# Patient Record
Sex: MALE | Race: WHITE | NOT HISPANIC OR LATINO | Employment: FULL TIME | URBAN - METROPOLITAN AREA
[De-identification: names, ages, dates, MRNs, and addresses within clinical notes are randomized per-mention and may not be internally consistent; named-entity substitution may affect disease eponyms.]

---

## 2019-04-29 ENCOUNTER — OCCUPATIONAL HEALTH (OUTPATIENT)
Dept: URGENT CARE | Facility: CLINIC | Age: 44
End: 2019-04-29

## 2019-04-29 ENCOUNTER — OFFICE VISIT (OUTPATIENT)
Dept: URGENT CARE | Facility: CLINIC | Age: 44
End: 2019-04-29
Payer: COMMERCIAL

## 2019-04-29 VITALS
BODY MASS INDEX: 28.17 KG/M2 | HEIGHT: 64 IN | WEIGHT: 165 LBS | TEMPERATURE: 97 F | DIASTOLIC BLOOD PRESSURE: 76 MMHG | RESPIRATION RATE: 16 BRPM | HEART RATE: 65 BPM | OXYGEN SATURATION: 98 % | SYSTOLIC BLOOD PRESSURE: 131 MMHG

## 2019-04-29 DIAGNOSIS — R10.9 ABDOMINAL WALL PAIN: ICD-10-CM

## 2019-04-29 DIAGNOSIS — K46.9 HERNIA OF ABDOMINAL CAVITY: Primary | ICD-10-CM

## 2019-04-29 DIAGNOSIS — Z02.83 ENCOUNTER FOR DRUG SCREENING: Primary | ICD-10-CM

## 2019-04-29 DIAGNOSIS — S39.011A STRAIN OF ABDOMINAL WALL, INITIAL ENCOUNTER: Primary | ICD-10-CM

## 2019-04-29 LAB — POC BREATH ALCOHOL: NEGATIVE

## 2019-04-29 PROCEDURE — 80305 DRUG TEST PRSMV DIR OPT OBS: CPT | Mod: S$GLB,,, | Performed by: PREVENTIVE MEDICINE

## 2019-04-29 PROCEDURE — 99204 PR OFFICE/OUTPT VISIT, NEW, LEVL IV, 45-59 MIN: ICD-10-PCS | Mod: S$GLB,,, | Performed by: PREVENTIVE MEDICINE

## 2019-04-29 PROCEDURE — 99204 OFFICE O/P NEW MOD 45 MIN: CPT | Mod: S$GLB,,, | Performed by: PREVENTIVE MEDICINE

## 2019-04-29 PROCEDURE — 80305 OOH COLLECTION ONLY DRUG SCREEN: ICD-10-PCS | Mod: S$GLB,,, | Performed by: PREVENTIVE MEDICINE

## 2019-04-29 PROCEDURE — 82075 ASSAY OF BREATH ETHANOL: CPT | Mod: S$GLB,,, | Performed by: PREVENTIVE MEDICINE

## 2019-04-29 PROCEDURE — 99199 UNLISTED SPECIAL SVC PX/RPRT: CPT | Mod: S$GLB,,, | Performed by: PREVENTIVE MEDICINE

## 2019-04-29 PROCEDURE — 99199 OCC MED MRO FEE: ICD-10-PCS | Mod: S$GLB,,, | Performed by: PREVENTIVE MEDICINE

## 2019-04-29 PROCEDURE — 82075 POCT ALCOHOL BREATH TEST: ICD-10-PCS | Mod: S$GLB,,, | Performed by: PREVENTIVE MEDICINE

## 2019-04-29 RX ORDER — PREDNISONE 10 MG/1
TABLET ORAL
Refills: 0 | COMMUNITY
Start: 2019-03-11

## 2019-04-29 RX ORDER — TESTOSTERONE CYPIONATE 200 MG/ML
INJECTION, SOLUTION INTRAMUSCULAR
Refills: 3 | COMMUNITY
Start: 2019-04-23

## 2019-04-29 RX ORDER — METHOCARBAMOL 750 MG/1
TABLET, FILM COATED ORAL
Refills: 0 | COMMUNITY
Start: 2019-03-11

## 2019-04-29 NOTE — LETTER
[facility]  [address]  [place] [address]  Phone: [phone]  Fax: [phone]  [facility]: [id number]    Pt Name: Bart Olvera  Injury Date: 04/28/2019   Employee ID:  Date of First Treatment: 04/29/2019   Company: [employer]      Appointment Time:  Arrived: 8:30 AM   Provider: Fabio Floyd MD Time Out: 10:30 AM     Office Treatment:   1. Strain of abdominal wall, initial encounter    2. Abdominal wall pain          Patient Instructions: Daily home exercises/warm soaks(Maintain light exercise for abdominal wall muscle. Monitor for any changes while working and exercising. Take OTC medication as needed.)    Restrictions: Regular Duty     Return Appointment: 05/20/2019 at 9:30 AM

## 2019-04-29 NOTE — PROGRESS NOTES
Subjective:       Patient ID: Bart Olvera is a 43 y.o. male.    Chief Complaint: Abdominal Pain    NEW WC INJ of Abdomen ( DOI 04-28-19 ) While at work he slid a sofa back and felt a burning sensation in umbilical area and then a bulge appear. Pain score today is 2/10 with concern about the bulge. Questioning about possible surgery? GAYE    Review of Systems   Gastrointestinal: Positive for abdominal pain. Negative for nausea.   All other systems reviewed and are negative.      Objective:      Physical Exam   Constitutional: He is oriented to person, place, and time. He appears well-developed and well-nourished.   HENT:   Head: Normocephalic and atraumatic.   Eyes: Pupils are equal, round, and reactive to light. EOM are normal.   Neck: Normal range of motion. Neck supple.   Cardiovascular: Normal rate.   Pulmonary/Chest: Effort normal.   Abdominal: Soft. Bowel sounds are normal. There is tenderness.       Mild pain with palpation of the wall of the abdomen midline. Patient has weakness with palpation and inspection of the abdominus rectus muscle wall just above the umbilicus. No apparent hernia noted at this time. Bowel sounds active with no significant pain on rebound on deep palpation of the abdomen.   Musculoskeletal: Normal range of motion.   Neurological: He is alert and oriented to person, place, and time.   Skin: Skin is warm and dry.   Psychiatric: He has a normal mood and affect.   Nursing note and vitals reviewed.      Assessment:       1. Strain of abdominal wall, initial encounter    2. Abdominal wall pain        Plan:            Patient Instructions: Daily home exercises/warm soaks(Maintain light exercise for abdominal wall muscle. Monitor for any changes while working and exercising. Take OTC medication as needed.)   Restrictions: Regular Duty  Follow up in about 3 weeks (around 5/20/2019).

## 2022-05-09 ENCOUNTER — OFFICE VISIT (OUTPATIENT)
Dept: URGENT CARE | Facility: CLINIC | Age: 47
End: 2022-05-09
Payer: COMMERCIAL

## 2022-05-09 VITALS
BODY MASS INDEX: 26.66 KG/M2 | DIASTOLIC BLOOD PRESSURE: 74 MMHG | WEIGHT: 160 LBS | HEART RATE: 69 BPM | TEMPERATURE: 99 F | HEIGHT: 65 IN | SYSTOLIC BLOOD PRESSURE: 116 MMHG | OXYGEN SATURATION: 97 %

## 2022-05-09 DIAGNOSIS — R07.89 CHEST TIGHTNESS: ICD-10-CM

## 2022-05-09 DIAGNOSIS — R94.39 ABNORMAL CARDIOVASCULAR STRESS TEST: Primary | ICD-10-CM

## 2022-05-09 DIAGNOSIS — R05.9 COUGH: ICD-10-CM

## 2022-05-09 PROCEDURE — 99203 PR OFFICE/OUTPT VISIT, NEW, LEVL III, 30-44 MIN: ICD-10-PCS | Mod: S$GLB,,, | Performed by: EMERGENCY MEDICINE

## 2022-05-09 PROCEDURE — 99203 OFFICE O/P NEW LOW 30 MIN: CPT | Mod: S$GLB,,, | Performed by: EMERGENCY MEDICINE

## 2022-05-09 NOTE — PROGRESS NOTES
Subjective:       Patient ID: Bart Olvera is a 46 y.o. male.    Chief Complaint: Chest Pain and Cough    ROSALINA SR (DOI 3- 2022) worker for [employer]. Patient is here today to follow up on symptoms he had checked out at  on May 6th. He had been experiencing check tightness and having coughing spell during a regular work day. He went to be checked out after he could not stop coughing for nearly three hours. He was to the point of almost passing out. He was given a stress test and X-rays were taking. He was given a follow up appt because something came back on the test results. He assigned 0/10 pain today and states the last coughing spell was on May 6th and he found it very hard to get it undr econtrol. He does not have an inhaler.  [facility]      PATIENT IS A 46-YEAR-OLD MALE WITH NO SIGNIFICANT PAST MEDICAL HISTORY FIBER MIN.  LAST WEEK HE WAS HAVING SOME CHEST TIGHTNESS AND COUGHING SPELLS WHICH PROMPTED HIM TO GET CHECKED OUT OF THE EMERGENCY DEPARTMENT.  HE HAD EKG SHOWING NO ACUTE ISCHEMIC CHANGES AS WELL AS CHEST X-RAY SHOWING NO ACUTE ABNORMALITY.  HIS VITAL SIGNS WERE STABLE.  HIS TROPONINS WERE NEGATIVE.  DUE TO HIS STORY OF CHEST TIGHTNESS AND NEGATIVE WORKUP WAS ADMITTED FOR OBSERVATION AND DID SEE CARDIOLOGIST WHO PERFORMED A CHEMICAL STRESS TEST.  THE STRESS TEST WAS ABNORMAL WITH SOME WALL MOTION ABNORMALITY AND DESCRIBED AS MILDLY POSITIVE.  HE WAS PLACED ON LONG-ACTING NITROGLYCERIN AND ARRANGE FOLLOW-UP WITH CARDIOLOGY IN 2 WEEKS TO DECIDE WHETHER OR NOT ANGIOGRAM INDICATED.  HE WILL KEEP THAT APPOINTMENT AND FOLLOW THE RECOMMENDATIONS OF CARDIOLOGY AND WILL NEED CARDIOLOGY CLEARANCE TO RETURN TO WORK.  I ALSO INSTRUCTED HIM TO FOLLOW-UP WITH PRIMARY CARE PHYSICIAN IF THIS ERRATIC COUGH CONTINUES.  HE DENIES ANY SYMPTOMS AT THIS TIME INCLUDING NO CHEST PAIN NO CHEST TIGHTNESS NO COUGH.  HOWEVER CERTAINLY GIVEN JOB DESCRIPTION WOULD BENEFIT FROM FULL CARDIOLOGY CLEARANCE BEFORE  RETURNING TO WORK.      ROS    Constitution: Positive for activity change.   HENT: Negative.    Neck: neck negative.   Cardiovascular: Positive for chest pain.   Eyes: Negative for vision loss, double vision and blurred vision.   Respiratory: Positive for cough and shortness of breath. Negative for sputum production, wheezing and asthma.    Genitourinary: Negative.    Musculoskeletal: Negative for pain, trauma, joint pain and joint swelling.   Skin: Negative.    Allergic/Immunologic: Negative.  Negative for asthma.   Neurological: Negative for passing out and loss of balance.   Hematologic/Lymphatic: Negative.         Objective:      Physical Exam  Vitals and nursing note reviewed.   Constitutional:       General: He is not in acute distress.     Appearance: Normal appearance. He is well-developed. He is not ill-appearing, toxic-appearing or diaphoretic.   HENT:      Head: Normocephalic and atraumatic.      Jaw: No trismus.      Right Ear: Hearing, tympanic membrane, ear canal and external ear normal.      Left Ear: Hearing, tympanic membrane, ear canal and external ear normal.      Nose: Nose normal. No nasal deformity, mucosal edema or rhinorrhea.      Right Sinus: No maxillary sinus tenderness or frontal sinus tenderness.      Left Sinus: No maxillary sinus tenderness or frontal sinus tenderness.      Mouth/Throat:      Dentition: Normal dentition.      Pharynx: Uvula midline. No posterior oropharyngeal erythema or uvula swelling.   Eyes:      General: Lids are normal. No scleral icterus.     Conjunctiva/sclera: Conjunctivae normal.      Comments: Sclera clear bilat   Neck:      Trachea: Trachea and phonation normal.   Cardiovascular:      Rate and Rhythm: Normal rate and regular rhythm.      Pulses: Normal pulses.      Heart sounds: Normal heart sounds.   Pulmonary:      Effort: Pulmonary effort is normal. No respiratory distress.      Breath sounds: Normal breath sounds.   Abdominal:      General: Bowel sounds  are normal. There is no distension.      Palpations: Abdomen is soft.      Tenderness: There is no abdominal tenderness.   Musculoskeletal:         General: No deformity. Normal range of motion.      Cervical back: Full passive range of motion without pain and neck supple.   Skin:     General: Skin is warm and dry.      Coloration: Skin is not pale.   Neurological:      Mental Status: He is alert and oriented to person, place, and time.      Motor: No abnormal muscle tone.      Coordination: Coordination normal.   Psychiatric:         Speech: Speech normal.         Behavior: Behavior normal. Behavior is cooperative.         Thought Content: Thought content normal.         Judgment: Judgment normal.         Assessment:       1. Abnormal cardiovascular stress test    2. Chest tightness    3. Cough        Plan:       PATIENT IS A 46-YEAR-OLD MALE WITH NO SIGNIFICANT PAST MEDICAL HISTORY FIBER MIN.  LAST WEEK HE WAS HAVING SOME CHEST TIGHTNESS AND COUGHING SPELLS WHICH PROMPTED HIM TO GET CHECKED OUT OF THE EMERGENCY DEPARTMENT.  HE HAD EKG SHOWING NO ACUTE ISCHEMIC CHANGES AS WELL AS CHEST X-RAY SHOWING NO ACUTE ABNORMALITY.  HIS VITAL SIGNS WERE STABLE.  HIS TROPONINS WERE NEGATIVE.  DUE TO HIS STORY OF CHEST TIGHTNESS AND NEGATIVE WORKUP WAS ADMITTED FOR OBSERVATION AND DID SEE CARDIOLOGIST WHO PERFORMED A CHEMICAL STRESS TEST.  THE STRESS TEST WAS ABNORMAL WITH SOME WALL MOTION ABNORMALITY AND DESCRIBED AS MILDLY POSITIVE.  HE WAS PLACED ON LONG-ACTING NITROGLYCERIN AND ARRANGE FOLLOW-UP WITH CARDIOLOGY IN 2 WEEKS TO DECIDE WHETHER OR NOT ANGIOGRAM INDICATED.  HE WILL KEEP THAT APPOINTMENT AND FOLLOW THE RECOMMENDATIONS OF CARDIOLOGY AND WILL NEED CARDIOLOGY CLEARANCE TO RETURN TO WORK.  I ALSO INSTRUCTED HIM TO FOLLOW-UP WITH PRIMARY CARE PHYSICIAN IF THIS ERRATIC COUGH CONTINUES.  HE DENIES ANY SYMPTOMS AT THIS TIME INCLUDING NO CHEST PAIN NO CHEST TIGHTNESS NO COUGH.  HOWEVER CERTAINLY GIVEN JOB DESCRIPTION WOULD  BENEFIT FROM FULL CARDIOLOGY CLEARANCE BEFORE RETURNING TO WORK.     Patient Instructions:  (FOLLOW UP WITH CARDIOLOGY AND PCP AS PLANNED)   Restrictions: Disabled until next office visit  Follow up in about 3 weeks (around 5/30/2022) for BE CERTAIN TO GET RECOMMENDATIONS/CLEARANCE BY CARDIOLOGY BEFORE RETURNING HERE FOR RTW EVALUATION.

## 2022-05-09 NOTE — LETTER
[facility]  [address] [address]  Phone: [phone]  Fax: [phone]  [facility]: [id number]    Pt Name: Bart Olvera  Injury Date: 05/06/2022   Employee ID: [id number] Date of First Treatment: 05/09/2022   Company: [employer]      Appointment Time: 10:00 AM Arrived: 10:00 AM   Provider: Ascencion Boswell MD Time Out: 11:40 AM     Office Treatment:   1. Abnormal cardiovascular stress test    2. Chest tightness    3. Cough          Patient Instructions:  (FOLLOW UP WITH CARDIOLOGY AND PCP AS PLANNED)      Restrictions: Disabled until next office visit     Return Appointment: 5/30/2022 at 9:30 AM KASSY

## 2022-05-24 ENCOUNTER — OCCUPATIONAL HEALTH (OUTPATIENT)
Dept: URGENT CARE | Facility: CLINIC | Age: 47
End: 2022-05-24
Payer: COMMERCIAL

## 2022-05-24 DIAGNOSIS — Z76.89 RETURN TO WORK EVALUATION: Primary | ICD-10-CM

## 2022-05-24 PROCEDURE — 99499 UNLISTED E&M SERVICE: CPT | Mod: S$GLB,,, | Performed by: EMERGENCY MEDICINE

## 2022-05-24 PROCEDURE — 99499 RETURN TO WORK EXAM: BASIC: ICD-10-PCS | Mod: S$GLB,,, | Performed by: EMERGENCY MEDICINE

## 2024-04-05 ENCOUNTER — OFFICE VISIT (OUTPATIENT)
Dept: URGENT CARE | Facility: CLINIC | Age: 49
End: 2024-04-05
Payer: COMMERCIAL

## 2024-04-05 VITALS
HEIGHT: 65 IN | OXYGEN SATURATION: 99 % | RESPIRATION RATE: 18 BRPM | DIASTOLIC BLOOD PRESSURE: 79 MMHG | WEIGHT: 159 LBS | BODY MASS INDEX: 26.49 KG/M2 | HEART RATE: 72 BPM | SYSTOLIC BLOOD PRESSURE: 130 MMHG
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
## 2024-04-05 DIAGNOSIS — Z02.6 ENCOUNTER RELATED TO WORKER'S COMPENSATION CLAIM: ICD-10-CM

## 2024-04-05 DIAGNOSIS — S62.309A: Primary | ICD-10-CM

## 2024-04-05 PROCEDURE — 99203 OFFICE O/P NEW LOW 30 MIN: CPT | Mod: S$GLB,,, | Performed by: PHYSICIAN ASSISTANT

## 2024-04-05 NOTE — LETTER
[facility]  [address] [address]  Phone: [phone]  Fax: [phone]  [facility]: [id number]    Pt Name: Bart Olvera  Injury Date: 04/02/2024   Employee ID: [id number] Date of First Treatment: 04/05/2024   Company: "[employer]      Appointment Time:  Arrived: 11:08 AM    Provider: Lisandro Sanders PA-C Time Out:1:13 PM     Office Treatment:   1. Closed nondisplaced fracture of metacarpal bone of left hand    2. Encounter related to worker's compensation claim    3. Work related injury          Patient Instructions: Attention not to aggravate affected area, Use splint as directed (Follow-up with orthopedics as scheduled.  Return to clinic as needed.)      Restrictions: Limited use of left hand and arm, No lifting/pushing/pulling more than 10 lbs (Keep middle finger splinted.)     Return Appointment: 4/19/2024 at 8:30 AM BERTRAM

## 2024-04-05 NOTE — PROGRESS NOTES
Subjective:      Patient ID: Bart Olvera is a 48 y.o. male.    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 Pt states that he did not pay no mind to his hand until he noticed swelling.   What they did immediately after - Applied ice. The next day went and got evaluated by PCP.  Pain scale right now - 5/10  SB.    Pt states that he was called with his X-Ray results yesterday and told he has a fractured finger. Pt took it upon hisself to get a finger splint since he was not instructed on what to do with that information.       Provider note:  48-year-old right-hand dominant male  presents with pain and swelling to the left 3rd MCP.  Patient states he accidentally jammed his left hand and middle finger on a door handle while at work 3 days ago.  Patient was seen at primary care and had an x-ray that revealed a cortical lucency through the 3rd medicarpal head suspicious for fracture.  Patient states he was referred to Orthopedics and has an appointment next week.  Says his primary care did not give him instructions as to how to care for his finger once he notified him of the x-ray results.  Patient decided to make a worker's comp claim and is here for evaluation.  MEB        Constitution: Positive for activity change.   HENT:  Negative for facial trauma.    Neck: Negative for neck pain.   Cardiovascular:  Negative for chest trauma.   Eyes:  Negative for eye trauma.   Respiratory:  Negative for shortness of breath.    Gastrointestinal:  Negative for abdominal trauma.   Musculoskeletal:  Positive for joint pain, joint swelling and abnormal ROM of joint. Negative for  muscle cramps and muscle ache.   Skin:  Negative for wound and bruising.   Neurological:  Positive for numbness and tingling.     Objective:     Physical Exam  Vitals and nursing note reviewed.   Constitutional:       General: He is not in acute distress.     Appearance: He is well-developed. He is not diaphoretic.   HENT:      Head: Normocephalic and atraumatic.      Right Ear: Hearing and external ear normal.      Left Ear: Hearing and external ear normal.      Nose: Nose normal. No nasal deformity.   Eyes:      General: Lids are normal. No scleral icterus.     Conjunctiva/sclera: Conjunctivae normal.   Neck:      Trachea: Trachea normal.   Cardiovascular:      Pulses: Normal pulses.   Pulmonary:      Effort: Pulmonary effort is normal. No respiratory distress.      Breath sounds: No stridor.   Musculoskeletal:      Left hand: Swelling and tenderness present. No deformity. Normal range of motion. Normal sensation. Normal capillary refill.        Hands:       Cervical back: Normal range of motion.      Comments: Focal swelling and tenderness over the 3rd MC head.  There is no ecchymosis or deformity.  Full active range of motion of the finger.  Neurovascularly intact.   Skin:     General: Skin is warm and dry.      Capillary Refill: Capillary refill takes less than 2 seconds.   Neurological:      Mental Status: He is alert. He is not disoriented.      GCS: GCS eye subscore is 4. GCS verbal subscore is 5. GCS motor subscore is 6.      Sensory: No sensory deficit.   Psychiatric:         Attention and Perception: He is attentive.         Speech: Speech normal.         Behavior: Behavior normal.        Assessment:      1. Closed nondisplaced fracture of metacarpal bone of left hand    2. Encounter related to worker's compensation claim    3. Work related injury      Plan:     It is with a high degree of medical certainty that this patient's current signs and symptoms were caused or exacerbated by the work related  injury mentioned in the note above    I reviewed the x-ray report from outside clinic.  History and physical consistent with MC head injury.  Patient was given a splint and told to follow-up with orthopedics.  Patient instructed to notify ortho that this is a worker's comp injury.  Patient was given a return appointment here if he can not get in with ortho.  Advised to continue splinting, ice and elevate the hand.  He may take over-the-counter NSAIDs and Tylenol as needed for pain.  Patient verbalized understanding and agreement.     Patient Instructions: Attention not to aggravate affected area, Use splint as directed (Follow-up with orthopedics as scheduled.  Return to clinic as needed.)   Restrictions: Limited use of left hand and arm, No lifting/pushing/pulling more than 10 lbs (Keep middle finger splinted.)  Follow up in about 2 weeks (around 4/19/2024) for Reassessment.

## 2024-05-01 ENCOUNTER — OCCUPATIONAL HEALTH (OUTPATIENT)
Dept: URGENT CARE | Facility: CLINIC | Age: 49
End: 2024-05-01
Payer: COMMERCIAL

## 2024-05-01 DIAGNOSIS — Z76.89 RETURN TO WORK EVALUATION: Primary | ICD-10-CM

## 2024-05-01 PROCEDURE — 99499 UNLISTED E&M SERVICE: CPT | Mod: S$GLB,,,

## 2024-05-16 ENCOUNTER — OFFICE VISIT (OUTPATIENT)
Dept: UROLOGY | Facility: CLINIC | Age: 49
End: 2024-05-16
Payer: COMMERCIAL

## 2024-05-16 VITALS — WEIGHT: 158.94 LBS | BODY MASS INDEX: 26.48 KG/M2 | HEIGHT: 65 IN

## 2024-05-16 DIAGNOSIS — N20.1 URETERAL CALCULUS, LEFT: Primary | ICD-10-CM

## 2024-05-16 PROCEDURE — 99204 OFFICE O/P NEW MOD 45 MIN: CPT | Mod: S$GLB,,, | Performed by: UROLOGY

## 2024-05-16 PROCEDURE — 1159F MED LIST DOCD IN RCRD: CPT | Mod: CPTII,S$GLB,, | Performed by: UROLOGY

## 2024-05-16 PROCEDURE — 3008F BODY MASS INDEX DOCD: CPT | Mod: CPTII,S$GLB,, | Performed by: UROLOGY

## 2024-05-16 PROCEDURE — 99999 PR PBB SHADOW E&M-EST. PATIENT-LVL III: CPT | Mod: PBBFAC,,, | Performed by: UROLOGY

## 2024-05-16 RX ORDER — TAMSULOSIN HYDROCHLORIDE 0.4 MG/1
0.4 CAPSULE ORAL DAILY
COMMUNITY

## 2024-05-16 NOTE — PROGRESS NOTES
Subjective:       Patient ID: Bart Olvera is a 48 y.o. male.    Chief Complaint: Nephrolithiasis    HPI   patient who has a  5 mm stone at the left UVJ he is having pain he has pain medications and Toradol along with meds for nausea etcetera he needs a KUB  but wanted to ask some questions he saw Dr. Cruz foy and him and who recommend what sound like a ureteroscopy he also has a 3 mm stone up in the kidney his not having any pain as we speak his urine looks uninfected.    Past Medical History:   Diagnosis Date    Acid reflux        Past Surgical History:   Procedure Laterality Date    VASECTOMY         Family History   Problem Relation Name Age of Onset    No Known Problems Mother      No Known Problems Father         Social History     Socioeconomic History    Marital status: Single   Tobacco Use    Smoking status: Former     Types: Cigarettes    Smokeless tobacco: Never   Substance and Sexual Activity    Alcohol use: Not Currently     Comment: once a month    Drug use: No    Sexual activity: Yes       Allergies:  Patient has no known allergies.    Medications:    Current Outpatient Medications:     testosterone cypionate (DEPOTESTOTERONE CYPIONATE) 200 mg/mL injection, INJECT 0.8ML IM EVERY WEEKS, Disp: , Rfl: 3    methocarbamol (ROBAXIN) 750 MG Tab, TK 2 TS PO TID (Patient not taking: Reported on 5/1/2024), Disp: , Rfl: 0    predniSONE (DELTASONE) 10 MG tablet, , Disp: , Rfl: 0    tamsulosin (FLOMAX) 0.4 mg Cap, Take 0.4 mg by mouth once daily., Disp: , Rfl:     Review of Systems    Objective:      Physical Exam    Assessment:       No diagnosis found.    Plan:       There are no diagnoses linked to this encounter.      I have reviewed the x-rays and feel that patient best interest will be served by doing a left ureteroscopic stone extraction with possible renal stone extraction.  Patient will return to see Dr. Diaz and if he has any fever chills he will return to an emergency room immediately

## 2024-05-22 ENCOUNTER — OFFICE VISIT (OUTPATIENT)
Dept: URGENT CARE | Facility: CLINIC | Age: 49
End: 2024-05-22
Payer: COMMERCIAL

## 2024-05-22 VITALS
HEART RATE: 76 BPM | OXYGEN SATURATION: 98 % | SYSTOLIC BLOOD PRESSURE: 140 MMHG | TEMPERATURE: 99 F | DIASTOLIC BLOOD PRESSURE: 73 MMHG | RESPIRATION RATE: 18 BRPM | BODY MASS INDEX: 26.33 KG/M2 | HEIGHT: 65 IN | WEIGHT: 158 LBS

## 2024-05-22 DIAGNOSIS — Z02.6 ENCOUNTER RELATED TO WORKER'S COMPENSATION CLAIM: Primary | ICD-10-CM

## 2024-05-22 DIAGNOSIS — S62.309A: ICD-10-CM

## 2024-05-22 PROCEDURE — 99213 OFFICE O/P EST LOW 20 MIN: CPT | Mod: S$GLB,,, | Performed by: NURSE PRACTITIONER

## 2024-05-22 RX ORDER — HYDROCODONE BITARTRATE AND ACETAMINOPHEN 5; 325 MG/1; MG/1
1 TABLET ORAL EVERY 6 HOURS PRN
COMMUNITY
Start: 2024-01-11

## 2024-05-22 NOTE — LETTER
[facility]  [address] [address]  Phone: [phone]  Fax: [phone]  [facility]: [id number]    Pt Name: Bart Olvera  Injury Date: 04/02/2024   Employee ID: [id number] Date of Treatment: 05/22/2024   Company: [employer]      Appointment Time:  Arrived: 10:27 AM    Provider: Linnea Díaz NP Time Out:11:25 AM      Office Treatment:   1. Encounter related to worker's compensation claim    2. Closed nondisplaced fracture of metacarpal bone of left hand          Patient Instructions: Attention not to aggravate affected area, Use splint as directed      Restrictions: Disabled until next office visit, Discharged to Ortho/Neuro/Opthomologist/Surgeon     Return As needed. KASSY

## 2024-05-22 NOTE — PROGRESS NOTES
Subjective:      Patient ID: Bart Olvera is a 48 y.o. male.    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  He says there is no fracture but rather there is a ligament injury and that he needs to keep hand immobilized to help prevent having surgery.  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  He tried going back to work however he would accidentally hit his hand or use it which would cause severe pain that would bring him to his knees.  He is here today requesting to be put out of work until he can be released to his regular job duties.  He has a follow-up appointment with ortho on the 28th. MWT    Hand Injury   Pertinent negatives include no numbness.       Musculoskeletal:  Positive for pain, joint pain, joint swelling, abnormal ROM of joint and muscle ache. Negative for trauma and muscle cramps.   Skin:  Negative for color change, erythema and bruising.   Neurological:  Negative for numbness and tingling.     Objective:     Physical Exam  Constitutional:       General: He is not in acute distress.     Appearance: Normal appearance.   HENT:      Right Ear: External ear normal.      Left Ear: External ear normal.   Eyes:      Conjunctiva/sclera: Conjunctivae normal.   Cardiovascular:      Pulses: Normal pulses.   Pulmonary:      Effort: Pulmonary effort is normal.   Abdominal:      General: Abdomen is flat.   Musculoskeletal:      Left hand: Swelling and tenderness  present. Decreased range of motion. Normal capillary refill. Normal pulse.        Hands:    Skin:     General: Skin is warm and dry.      Capillary Refill: Capillary refill takes less than 2 seconds.      Findings: No bruising or erythema.   Neurological:      General: No focal deficit present.      Mental Status: He is alert and oriented to person, place, and time.   Psychiatric:         Mood and Affect: Mood normal.         Behavior: Behavior normal.         Thought Content: Thought content normal.         Judgment: Judgment normal.        Assessment:      1. Encounter related to worker's compensation claim    2. Closed nondisplaced fracture of metacarpal bone of left hand      Plan:     Patient is being treated by an orthopedist in [place].  He is attempted to return to work but keeps hitting his hand or over using his hand which causes severe pain.  He is trying to avoid surgery by keeping the hand immobilized in the splint that was provided by Orthopedics.  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  He will follow up with ortho on the 28th as scheduled.     Patient Instructions: Attention not to aggravate affected area, Use splint as directed   Restrictions: Disabled until next office visit, Discharged to Ortho/Neuro/Opthomologist/Surgeon  Follow up if symptoms worsen or fail to improve.    I spent a total of 20 minutes on the day of the visit.This includes face to face time and non-face to face time preparing to see the patient (eg, review of tests), obtaining and/or reviewing separately obtained history, documenting clinical information in the electronic or other health record, independently interpreting results and communicating results to the patient/family/caregiver, or care coordinator.

## 2024-07-03 ENCOUNTER — OCCUPATIONAL HEALTH (OUTPATIENT)
Dept: URGENT CARE | Facility: CLINIC | Age: 49
End: 2024-07-03

## 2024-07-03 DIAGNOSIS — Z76.89 RETURN TO WORK EVALUATION: Primary | ICD-10-CM

## 2025-09-02 ENCOUNTER — OCCUPATIONAL HEALTH (OUTPATIENT)
Dept: URGENT CARE | Facility: CLINIC | Age: 50
End: 2025-09-02

## 2025-09-02 DIAGNOSIS — Z02.83 ENCOUNTER FOR DRUG SCREENING: Primary | ICD-10-CM

## 2025-09-02 LAB — BREATH ALCOHOL: 0
